# Patient Record
Sex: FEMALE | Race: BLACK OR AFRICAN AMERICAN | NOT HISPANIC OR LATINO | URBAN - METROPOLITAN AREA
[De-identification: names, ages, dates, MRNs, and addresses within clinical notes are randomized per-mention and may not be internally consistent; named-entity substitution may affect disease eponyms.]

---

## 2020-09-25 ENCOUNTER — OFFICE VISIT (OUTPATIENT)
Dept: URBAN - METROPOLITAN AREA CLINIC 84 | Facility: CLINIC | Age: 35
End: 2020-09-25
Payer: COMMERCIAL

## 2020-09-25 ENCOUNTER — WEB ENCOUNTER (OUTPATIENT)
Dept: URBAN - METROPOLITAN AREA CLINIC 84 | Facility: CLINIC | Age: 35
End: 2020-09-25

## 2020-09-25 DIAGNOSIS — K59.01 CONSTIPATION: ICD-10-CM

## 2020-09-25 DIAGNOSIS — R19.4 BOWEL HABIT CHANGES: ICD-10-CM

## 2020-09-25 DIAGNOSIS — R10.9 ABDOMINAL PAIN: ICD-10-CM

## 2020-09-25 DIAGNOSIS — R14.1 BLOATING AND GAS PAIN: ICD-10-CM

## 2020-09-25 PROCEDURE — 1036F TOBACCO NON-USER: CPT | Performed by: INTERNAL MEDICINE

## 2020-09-25 PROCEDURE — G8427 DOCREV CUR MEDS BY ELIG CLIN: HCPCS | Performed by: INTERNAL MEDICINE

## 2020-09-25 PROCEDURE — 99204 OFFICE O/P NEW MOD 45 MIN: CPT | Performed by: INTERNAL MEDICINE

## 2020-09-25 PROCEDURE — G8417 CALC BMI ABV UP PARAM F/U: HCPCS | Performed by: INTERNAL MEDICINE

## 2020-09-25 RX ORDER — SIMETHICONE 250 MG/1
1 CAPSULE AFTER MEALS AND AT BEDTIME AS NEEDED CAPSULE, GELATIN COATED ORAL TWICE A DAY
Qty: 60 CAPSULE | Refills: 3 | OUTPATIENT
Start: 2020-09-25

## 2020-09-25 RX ORDER — POLYETHYLENE GLYCOL 3350 17 G/17G
AS DIRECTED POWDER, FOR SOLUTION ORAL ONCE A DAY
Qty: 238 GRAM | Refills: 0 | OUTPATIENT
Start: 2020-09-25

## 2020-09-25 RX ORDER — LUBIPROSTONE 24 UG/1
1 CAPSULE WITH FOOD AND WATER CAPSULE, GELATIN COATED ORAL TWICE A DAY
Qty: 60 CAPSULE | Refills: 4 | OUTPATIENT
Start: 2020-09-25

## 2020-09-25 NOTE — PHYSICAL EXAM NEUROLOGIC:
"Patient is returning your call. She has to have 2 surgeries done before the end of the year. Another eye surgery and carpal tunnel surgery. "" I am not putting this off but will have to wait till next year. \""    " oriented to person, place and time

## 2020-09-25 NOTE — HPI-TODAY'S VISIT:
Patient present with c/o abdominal pain s/p hemorrhoidectomy in July 2020. Pt. has had intermittent constipation requiring stool softners which may lead to diarrhea. Pt admits to bloating/gas. Prior to recent changes in bowel habits, pt's baseline bowel habits have been similar. Patient admits to being on percocets and ibuprofen for post-op pain control.

## 2021-01-08 ENCOUNTER — OFFICE VISIT (OUTPATIENT)
Dept: URBAN - METROPOLITAN AREA CLINIC 118 | Facility: CLINIC | Age: 36
End: 2021-01-08

## 2021-03-02 ENCOUNTER — DASHBOARD ENCOUNTERS (OUTPATIENT)
Age: 36
End: 2021-03-02

## 2021-03-02 ENCOUNTER — OFFICE VISIT (OUTPATIENT)
Dept: URBAN - METROPOLITAN AREA CLINIC 118 | Facility: CLINIC | Age: 36
End: 2021-03-02

## 2021-03-02 RX ORDER — LUBIPROSTONE 24 UG/1
1 CAPSULE WITH FOOD AND WATER CAPSULE, GELATIN COATED ORAL TWICE A DAY
Qty: 60 CAPSULE | Refills: 4 | Status: DISCONTINUED | COMMUNITY
Start: 2020-09-25

## 2021-03-02 RX ORDER — SIMETHICONE 250 MG/1
1 CAPSULE AFTER MEALS AND AT BEDTIME AS NEEDED CAPSULE, GELATIN COATED ORAL TWICE A DAY
Qty: 60 CAPSULE | Refills: 3 | Status: DISCONTINUED | COMMUNITY
Start: 2020-09-25

## 2021-03-02 RX ORDER — POLYETHYLENE GLYCOL 3350 17 G/17G
AS DIRECTED POWDER, FOR SOLUTION ORAL ONCE A DAY
Qty: 238 GRAM | Refills: 0 | Status: DISCONTINUED | COMMUNITY
Start: 2020-09-25